# Patient Record
Sex: FEMALE | URBAN - METROPOLITAN AREA
[De-identification: names, ages, dates, MRNs, and addresses within clinical notes are randomized per-mention and may not be internally consistent; named-entity substitution may affect disease eponyms.]

---

## 2021-10-11 ENCOUNTER — HOSPITAL ENCOUNTER (OUTPATIENT)
Age: 86
Setting detail: SPECIMEN
Discharge: HOME | End: 2021-10-11
Payer: MEDICARE

## 2021-10-11 LAB
ABSOLUTE EOS #: 0.24 K/UL (ref 0–0.44)
ABSOLUTE IMMATURE GRANULOCYTE: 0 K/UL (ref 0–0.3)
ABSOLUTE LYMPH #: 4.88 K/UL (ref 1.1–3.7)
ABSOLUTE MONO #: 1.59 K/UL (ref 0.1–1.2)
BASOPHILS # BLD: 1 % (ref 0–2)
BASOPHILS ABSOLUTE: 0.12 K/UL (ref 0–0.2)
DIFFERENTIAL TYPE: ABNORMAL
EOSINOPHILS RELATIVE PERCENT: 2 % (ref 1–4)
HCT VFR BLD CALC: 41.1 % (ref 36.3–47.1)
HEMOGLOBIN: 12.7 G/DL (ref 11.9–15.1)
IMMATURE GRANULOCYTES: 0 %
LYMPHOCYTES # BLD: 40 % (ref 24–43)
MCH RBC QN AUTO: 32.5 PG (ref 25.2–33.5)
MCHC RBC AUTO-ENTMCNC: 30.9 G/DL (ref 28.4–34.8)
MCV RBC AUTO: 105.1 FL (ref 82.6–102.9)
MONOCYTES # BLD: 13 % (ref 3–12)
MORPHOLOGY: ABNORMAL
NRBC AUTOMATED: 0.9 PER 100 WBC
PDW BLD-RTO: 16.9 % (ref 11.8–14.4)
PLATELET # BLD: 220 K/UL (ref 138–453)
PLATELET ESTIMATE: ABNORMAL
PMV BLD AUTO: 12.3 FL (ref 8.1–13.5)
RBC # BLD: 3.91 M/UL (ref 3.95–5.11)
RBC # BLD: ABNORMAL 10*6/UL
SEG NEUTROPHILS: 44 % (ref 36–65)
SEGMENTED NEUTROPHILS ABSOLUTE COUNT: 5.37 K/UL (ref 1.5–8.1)
WBC # BLD: 12.2 K/UL (ref 3.5–11.3)
WBC # BLD: ABNORMAL 10*3/UL

## 2024-05-08 ENCOUNTER — APPOINTMENT (OUTPATIENT)
Dept: GENERAL RADIOLOGY | Age: 89
DRG: 603 | End: 2024-05-08
Payer: MEDICARE

## 2024-05-08 ENCOUNTER — APPOINTMENT (OUTPATIENT)
Dept: VASCULAR LAB | Age: 89
DRG: 603 | End: 2024-05-08
Payer: MEDICARE

## 2024-05-08 ENCOUNTER — HOSPITAL ENCOUNTER (INPATIENT)
Age: 89
LOS: 1 days | Discharge: HOME HEALTH | DRG: 603 | End: 2024-05-10
Attending: EMERGENCY MEDICINE | Admitting: HOSPITALIST
Payer: MEDICARE

## 2024-05-08 DIAGNOSIS — L03.116 BILATERAL CELLULITIS OF LOWER LEG: Primary | ICD-10-CM

## 2024-05-08 DIAGNOSIS — R60.0 BILATERAL LOWER EXTREMITY EDEMA: ICD-10-CM

## 2024-05-08 DIAGNOSIS — L03.115 BILATERAL CELLULITIS OF LOWER LEG: Primary | ICD-10-CM

## 2024-05-08 PROBLEM — I10 PRIMARY HYPERTENSION: Status: ACTIVE | Noted: 2024-05-08

## 2024-05-08 PROBLEM — G62.9 NEUROPATHY: Status: ACTIVE | Noted: 2024-05-08

## 2024-05-08 PROBLEM — C50.919 BREAST CANCER (HCC): Status: ACTIVE | Noted: 2024-05-08

## 2024-05-08 PROBLEM — L03.90 CELLULITIS: Status: ACTIVE | Noted: 2024-05-08

## 2024-05-08 LAB
ALBUMIN SERPL-MCNC: 4.2 G/DL (ref 3.5–5.2)
ALBUMIN/GLOB SERPL: 1.5 {RATIO} (ref 1–2.5)
ALP SERPL-CCNC: 122 U/L (ref 35–104)
ALT SERPL-CCNC: 18 U/L (ref 5–33)
ANION GAP SERPL CALCULATED.3IONS-SCNC: 12 MMOL/L (ref 9–17)
AST SERPL-CCNC: 22 U/L
BASOPHILS # BLD: 0.18 K/UL (ref 0–0.2)
BASOPHILS NFR BLD: 2 % (ref 0–2)
BILIRUB SERPL-MCNC: 0.8 MG/DL (ref 0.3–1.2)
BNP SERPL-MCNC: 2585 PG/ML
BUN SERPL-MCNC: 18 MG/DL (ref 8–23)
CALCIUM SERPL-MCNC: 8.9 MG/DL (ref 8.6–10.4)
CHLORIDE SERPL-SCNC: 105 MMOL/L (ref 98–107)
CO2 SERPL-SCNC: 23 MMOL/L (ref 20–31)
CREAT SERPL-MCNC: 0.9 MG/DL (ref 0.5–0.9)
ECHO BSA: 1.53 M2
EOSINOPHIL # BLD: 0 K/UL (ref 0–0.4)
EOSINOPHILS RELATIVE PERCENT: 0 % (ref 1–4)
ERYTHROCYTE [DISTWIDTH] IN BLOOD BY AUTOMATED COUNT: 16.2 % (ref 12.5–15.4)
GFR, ESTIMATED: 59 ML/MIN/1.73M2
GLUCOSE SERPL-MCNC: 115 MG/DL (ref 70–99)
HCT VFR BLD AUTO: 30.9 % (ref 36–46)
HGB BLD-MCNC: 10.3 G/DL (ref 12–16)
LACTATE BLDV-SCNC: 1.6 MMOL/L (ref 0.5–2.2)
LYMPHOCYTES NFR BLD: 2.02 K/UL (ref 1–4.8)
LYMPHOCYTES RELATIVE PERCENT: 22 % (ref 24–44)
MCH RBC QN AUTO: 36.7 PG (ref 26–34)
MCHC RBC AUTO-ENTMCNC: 33.4 G/DL (ref 31–37)
MCV RBC AUTO: 109.8 FL (ref 80–100)
MONOCYTES NFR BLD: 1.01 K/UL (ref 0.1–1.2)
MONOCYTES NFR BLD: 11 % (ref 2–11)
MORPHOLOGY: ABNORMAL
NEUTROPHILS NFR BLD: 65 % (ref 36–66)
NEUTS SEG NFR BLD: 5.99 K/UL (ref 1.8–7.7)
PLATELET # BLD AUTO: 234 K/UL (ref 140–450)
PMV BLD AUTO: 11 FL (ref 6–12)
POTASSIUM SERPL-SCNC: 4.4 MMOL/L (ref 3.7–5.3)
PROT SERPL-MCNC: 7 G/DL (ref 6.4–8.3)
RBC # BLD AUTO: 2.81 M/UL (ref 4–5.2)
SODIUM SERPL-SCNC: 140 MMOL/L (ref 135–144)
WBC OTHER # BLD: 9.2 K/UL (ref 3.5–11)

## 2024-05-08 PROCEDURE — G0378 HOSPITAL OBSERVATION PER HR: HCPCS

## 2024-05-08 PROCEDURE — 6360000002 HC RX W HCPCS

## 2024-05-08 PROCEDURE — 93970 EXTREMITY STUDY: CPT | Performed by: SURGERY

## 2024-05-08 PROCEDURE — 96368 THER/DIAG CONCURRENT INF: CPT

## 2024-05-08 PROCEDURE — 36415 COLL VENOUS BLD VENIPUNCTURE: CPT

## 2024-05-08 PROCEDURE — 96365 THER/PROPH/DIAG IV INF INIT: CPT

## 2024-05-08 PROCEDURE — 87040 BLOOD CULTURE FOR BACTERIA: CPT

## 2024-05-08 PROCEDURE — 83605 ASSAY OF LACTIC ACID: CPT

## 2024-05-08 PROCEDURE — 85025 COMPLETE CBC W/AUTO DIFF WBC: CPT

## 2024-05-08 PROCEDURE — 83880 ASSAY OF NATRIURETIC PEPTIDE: CPT

## 2024-05-08 PROCEDURE — 73562 X-RAY EXAM OF KNEE 3: CPT

## 2024-05-08 PROCEDURE — 96375 TX/PRO/DX INJ NEW DRUG ADDON: CPT

## 2024-05-08 PROCEDURE — 93970 EXTREMITY STUDY: CPT

## 2024-05-08 PROCEDURE — 80053 COMPREHEN METABOLIC PANEL: CPT

## 2024-05-08 PROCEDURE — 99222 1ST HOSP IP/OBS MODERATE 55: CPT

## 2024-05-08 PROCEDURE — 2580000003 HC RX 258

## 2024-05-08 PROCEDURE — 99285 EMERGENCY DEPT VISIT HI MDM: CPT

## 2024-05-08 PROCEDURE — 6370000000 HC RX 637 (ALT 250 FOR IP)

## 2024-05-08 RX ORDER — ONDANSETRON 4 MG/1
4 TABLET, ORALLY DISINTEGRATING ORAL EVERY 8 HOURS PRN
Status: DISCONTINUED | OUTPATIENT
Start: 2024-05-08 | End: 2024-05-10 | Stop reason: HOSPADM

## 2024-05-08 RX ORDER — TRAMADOL HYDROCHLORIDE 50 MG/1
50 TABLET ORAL DAILY
COMMUNITY

## 2024-05-08 RX ORDER — SODIUM CHLORIDE 0.9 % (FLUSH) 0.9 %
5-40 SYRINGE (ML) INJECTION EVERY 12 HOURS SCHEDULED
Status: DISCONTINUED | OUTPATIENT
Start: 2024-05-08 | End: 2024-05-10 | Stop reason: HOSPADM

## 2024-05-08 RX ORDER — SODIUM CHLORIDE, SODIUM LACTATE, POTASSIUM CHLORIDE, AND CALCIUM CHLORIDE .6; .31; .03; .02 G/100ML; G/100ML; G/100ML; G/100ML
1000 INJECTION, SOLUTION INTRAVENOUS ONCE
Status: COMPLETED | OUTPATIENT
Start: 2024-05-08 | End: 2024-05-08

## 2024-05-08 RX ORDER — GABAPENTIN 300 MG/1
300 CAPSULE ORAL NIGHTLY
Status: DISCONTINUED | OUTPATIENT
Start: 2024-05-08 | End: 2024-05-10 | Stop reason: HOSPADM

## 2024-05-08 RX ORDER — GABAPENTIN 300 MG/1
300 CAPSULE ORAL NIGHTLY
COMMUNITY

## 2024-05-08 RX ORDER — ENOXAPARIN SODIUM 100 MG/ML
40 INJECTION SUBCUTANEOUS DAILY
Status: DISCONTINUED | OUTPATIENT
Start: 2024-05-08 | End: 2024-05-10 | Stop reason: HOSPADM

## 2024-05-08 RX ORDER — ACETAMINOPHEN 650 MG/1
650 SUPPOSITORY RECTAL EVERY 6 HOURS PRN
Status: DISCONTINUED | OUTPATIENT
Start: 2024-05-08 | End: 2024-05-10 | Stop reason: HOSPADM

## 2024-05-08 RX ORDER — POTASSIUM CHLORIDE 7.45 MG/ML
10 INJECTION INTRAVENOUS PRN
Status: DISCONTINUED | OUTPATIENT
Start: 2024-05-08 | End: 2024-05-10 | Stop reason: HOSPADM

## 2024-05-08 RX ORDER — POTASSIUM CHLORIDE 20 MEQ/1
40 TABLET, EXTENDED RELEASE ORAL PRN
Status: DISCONTINUED | OUTPATIENT
Start: 2024-05-08 | End: 2024-05-10 | Stop reason: HOSPADM

## 2024-05-08 RX ORDER — MAGNESIUM SULFATE IN WATER 40 MG/ML
2000 INJECTION, SOLUTION INTRAVENOUS PRN
Status: DISCONTINUED | OUTPATIENT
Start: 2024-05-08 | End: 2024-05-10 | Stop reason: HOSPADM

## 2024-05-08 RX ORDER — ONDANSETRON 2 MG/ML
4 INJECTION INTRAMUSCULAR; INTRAVENOUS EVERY 6 HOURS PRN
Status: DISCONTINUED | OUTPATIENT
Start: 2024-05-08 | End: 2024-05-10 | Stop reason: HOSPADM

## 2024-05-08 RX ORDER — TRAMADOL HYDROCHLORIDE 50 MG/1
50 TABLET ORAL DAILY
Status: DISCONTINUED | OUTPATIENT
Start: 2024-05-08 | End: 2024-05-10 | Stop reason: HOSPADM

## 2024-05-08 RX ORDER — SODIUM CHLORIDE 9 MG/ML
INJECTION, SOLUTION INTRAVENOUS PRN
Status: DISCONTINUED | OUTPATIENT
Start: 2024-05-08 | End: 2024-05-10 | Stop reason: HOSPADM

## 2024-05-08 RX ORDER — FUROSEMIDE 10 MG/ML
20 INJECTION INTRAMUSCULAR; INTRAVENOUS 2 TIMES DAILY
Status: DISCONTINUED | OUTPATIENT
Start: 2024-05-08 | End: 2024-05-10

## 2024-05-08 RX ORDER — ACETAMINOPHEN 325 MG/1
650 TABLET ORAL EVERY 6 HOURS PRN
Status: DISCONTINUED | OUTPATIENT
Start: 2024-05-08 | End: 2024-05-10 | Stop reason: HOSPADM

## 2024-05-08 RX ORDER — LISINOPRIL 10 MG/1
10 TABLET ORAL DAILY
COMMUNITY

## 2024-05-08 RX ORDER — POLYETHYLENE GLYCOL 3350 17 G/17G
17 POWDER, FOR SOLUTION ORAL DAILY PRN
Status: DISCONTINUED | OUTPATIENT
Start: 2024-05-08 | End: 2024-05-10 | Stop reason: HOSPADM

## 2024-05-08 RX ORDER — SODIUM CHLORIDE 0.9 % (FLUSH) 0.9 %
5-40 SYRINGE (ML) INJECTION PRN
Status: DISCONTINUED | OUTPATIENT
Start: 2024-05-08 | End: 2024-05-10 | Stop reason: HOSPADM

## 2024-05-08 RX ORDER — LISINOPRIL 10 MG/1
10 TABLET ORAL DAILY
Status: DISCONTINUED | OUTPATIENT
Start: 2024-05-08 | End: 2024-05-10 | Stop reason: HOSPADM

## 2024-05-08 RX ADMIN — GABAPENTIN 300 MG: 300 CAPSULE ORAL at 21:51

## 2024-05-08 RX ADMIN — SODIUM CHLORIDE, POTASSIUM CHLORIDE, SODIUM LACTATE AND CALCIUM CHLORIDE 1000 ML: 600; 310; 30; 20 INJECTION, SOLUTION INTRAVENOUS at 16:53

## 2024-05-08 RX ADMIN — TRAMADOL HYDROCHLORIDE 50 MG: 50 TABLET, COATED ORAL at 21:50

## 2024-05-08 RX ADMIN — FUROSEMIDE 20 MG: 10 INJECTION, SOLUTION INTRAMUSCULAR; INTRAVENOUS at 21:51

## 2024-05-08 RX ADMIN — LISINOPRIL 10 MG: 10 TABLET ORAL at 21:50

## 2024-05-08 RX ADMIN — VANCOMYCIN HYDROCHLORIDE 750 MG: 750 INJECTION, POWDER, LYOPHILIZED, FOR SOLUTION INTRAVENOUS at 20:13

## 2024-05-08 RX ADMIN — CEFEPIME 2000 MG: 2 INJECTION, POWDER, FOR SOLUTION INTRAVENOUS at 19:40

## 2024-05-08 ASSESSMENT — PAIN - FUNCTIONAL ASSESSMENT: PAIN_FUNCTIONAL_ASSESSMENT: NONE - DENIES PAIN

## 2024-05-08 ASSESSMENT — ENCOUNTER SYMPTOMS
ALLERGIC/IMMUNOLOGIC NEGATIVE: 1
CHEST TIGHTNESS: 0
COUGH: 0
DIARRHEA: 0
EYES NEGATIVE: 1
RESPIRATORY NEGATIVE: 1
ABDOMINAL PAIN: 0
VOMITING: 0
NAUSEA: 0
CONSTIPATION: 0
GASTROINTESTINAL NEGATIVE: 1
SHORTNESS OF BREATH: 0

## 2024-05-08 NOTE — H&P
[facility]  Office: [phone]  Jj Resendez DO, Dieter Ramirez DO, Martin Bailey DO, David Guadalupe DO, Joellen Vicente MD, Jenny Wiseman MD, Neda Lazo MD, Yaritza Mcfadden MD,  Cornelius Saenz MD, Keith Do MD, Jong Jeffers MD,  Loraine Tyson DO, Alberto Jon MD, Ruben Beck MD, Garland Resendez DO, Pearl Cruz MD,  Len Aldridge DO, Olinda Siegel MD, Leandra Malagon MD, Mary Og MD, Shelby Alberts MD,  Melo Maddox MD, Annetta Morales MD, Ancelmo Mcknight MD, Gloria Shafer MD, Torsten Corcoran MD, Dung Prater MD, Vish Mujica DO, Luciano Ardon DO, Santi Case MD,  Uvaldo Daniels MD, Shirley Waterhouse, CNP,  Kristina Munoz CNP, Andre Vitale, CNP,  Antonina Lawrence, MARBELLA, Kajal Kramer, CNP, Olya Clemente, CNP, Denia Davis CNP, Lauren Connor, CNP, Muriel Cottrell, PA-C, Erica Taylor PA-C, India Kearney, CNP, Elizabet Lema, CNP, Dmitri Nichols, CNP, Kimberly High, CNP, Viviana Blanchard, CNP, Angelica Clark, CNS, Mary Jo Akbar, CNP, Laura Henderson CNP, Tracy Schwab, CNP         [facility]   IN-PATIENT SERVICE   [facility]    HISTORY AND PHYSICAL EXAMINATION            Date:   5/9/2024  Patient name:  Ana Handley  Date of admission:  5/8/2024  3:32 PM  MRN:   [id number]  Account:  [id number]  YOB: 1928  PCP:    Areli Nichols MD  Room:   [address] [place] [address]  Code Status:    Full Code    Chief Complaint:     Chief Complaint   Patient presents with    Wound Infection     Pt has multiple wounds on both legs, possible from chemo she states. Pt was sent here by doctor for IV antibiotics.      History Obtained From:     patient, electronic medical record    History of Present Illness:     Ana Handley is a 89 y.o. Unavailable / unknown female who presents with Wound Infection (Pt has multiple wounds on both legs, possible from chemo she states. Pt was sent here by doctor for IV antibiotics. )   and is admitted to the  failure playing a role.  Upon my chart review, no recent echocardiogram has been obtained.  Will initiate IV Lasix 20 twice daily and obtain an echocardiogram.  Lower extremity edema  Please see above  Lasix, echo  Breast cancer, actively undergoing chemotherapy  Outpatient follow-up with oncology  Resume home tramadol for pain  Hypertension  Resume home lisinopril  Neuropathy  Resume home gabapentin    Consultations:   IP CONSULT TO ORTHOPEDIC SURGERY    LAINEY Kovacs  5/9/2024  1:11 AM    Copy sent to Areli Crespo MD

## 2024-05-08 NOTE — PROGRESS NOTES
[facility]   Pharmacy Pharmacokinetic Monitoring Service - Vancomycin     Ana Handley is a 89 y.o. female starting on vancomycin therapy for SSTI. Pharmacy consulted by Ladarius Fraser for monitoring and adjustment.    Target Concentration: Goal AUC/MERLE 400-600 mg*hr/L    Additional Antimicrobials: cefepime    Pertinent Laboratory Values:   Wt Readings from Last 1 Encounters:   05/08/24 52.2 kg (115 lb)     Temp Readings from Last 1 Encounters:   05/08/24 97.7 °F (36.5 °C) (Oral)     Estimated Creatinine Clearance: 30 mL/min (based on SCr of 0.9 mg/dL).  Recent Labs     05/06/24  1133 05/08/24  1640   CREATININE 0.80 0.9   BUN 15 18   WBC 8.36 9.2     Procalcitonin:     Pertinent Cultures:  Culture Date Source Results        MRSA Nasal Swab: N/A. Non-respiratory infection.    Plan:  Dosing recommendations based on Bayesian software  Start vancomycin 750 mg every 24 hours  Anticipated AUC of 455 and trough concentration of 12.9 at steady state  Renal labs as indicated   Vancomycin concentration ordered for TBD @ TBD   Pharmacy will continue to monitor patient and adjust therapy as indicated    Thank you for the consult,  CECELIA NINO [facility]  5/8/2024 5:52 PM

## 2024-05-08 NOTE — ED PROVIDER NOTES
[address]  [address] [facility] [address]  Phone: [phone]    Pt Name: Ana Handley  MRN: [id number]  Birthdate 4/30/1928  Date of evaluation: 5/8/24    Ana Handley is a 89 y.o. female with CC: Wound Infection (Pt has multiple wounds on both legs, possible from chemo she states. Pt was sent here by doctor for IV antibiotics. )      MDM:   89-year-old female with a history of cancer, currently undergoing chemotherapy who presents emergency department at the direction of her physician due to concern for cellulitis.  Both of her lower extremities are edematous and hot to touch, there is a left knee medial effusion as well as a right knee large hematoma with adjacent blister and likely surrounding cellulitis.  There are other areas on the anterior shins with degloving but no active weeping or drainage.  She is afebrile here.  No elevation in WBCs.  No elevation in lactic acid.  Will abstain from draining the blister to prevent potential for further introduction of other pathogens.  Blood cultures obtained prior to initiation of antibiotics.  BNP was found to be significantly elevated, no known history of heart failure.  Venous Doppler bilateral lower extremities with no obvious evidence of DVT.  Will admit to hospitalist.  Family inquiring about wound care as well as ID consult.    Vitals:    05/08/24 1657 05/08/24 1700 05/08/24 2045 05/08/24 2220   BP: 125/69 131/86 136/62    Pulse:  76 87    Resp:  16 18 16   Temp:   97.5 °F (36.4 °C)    TempSrc:   Oral    SpO2:  98% 97%    Weight:       Height:           ED Course as of 05/08/24 2317   Wed May 08, 2024   1730 X-ray of the knee he does not show any concerns for osteomyelitis or suspected intra-articular infection at this time.  Suspect the hematoma is secondary to infection and inflammatory response.  At this time would not opt to drain it. [AH]   1852 Metabolic workup demonstrates stable anemia, no profound leukocytosis, no electrolyte  file.  SURGICAL HISTORY     No past surgical history on file.  CURRENT MEDICATIONS       Current Discharge Medication List        CONTINUE these medications which have NOT CHANGED    Details   gabapentin (NEURONTIN) 300 MG capsule Take 1 capsule by mouth at bedtime.      lisinopril (PRINIVIL;ZESTRIL) 10 MG tablet Take 1 tablet by mouth daily      traMADol (ULTRAM) 50 MG tablet Take 1 tablet by mouth daily. Max Daily Amount: 50 mg           ALLERGIES     is allergic to aspirin and oxycodone.  FAMILY HISTORY     has no family status information on file.      SOCIAL HISTORY          I performed a history and physical examination of the patient and discussed management with the mid level provideer. I reviewed the mid level provider's note and agree with the documented findings and plan of care. Any areas of disagreement are noted on the chart. I was personally present for the key portions of any procedures. I have documented in the chart those procedures where I was not present during the key portions. I have reviewed the emergency nurses triage note. I agree with the chief complaint, past medical history, past surgical history, allergies, medications, social and family history as documented unless otherwise noted below. Documentation of the HPI, Physical Exam and Medical Decision Making performed by mid level providers is based on my personal performance of the HPI, PE and MDM. For Physician Assistant/ Nurse Practitioner cases/documentation I have personally evaluated this patient and have completed at least one if not all key elements of the E/M (history, physical exam, and MDM). Additional findings are as noted.    Margaret Lorenzo DO  Attending Emergency Physician        Margaret Lorenzo DO  05/08/24 3384

## 2024-05-08 NOTE — ED PROVIDER NOTES
Toxic Shock Syndrome, DRESS syndrome, Chicken Pox, Varicella Zoster, Herpes, Parvovirus B19, eningococcemia, Pemphigus Vulgaris     ED COURSE:    ED Course as of 05/09/24 2132   Wed May 08, 2024   1730 X-ray of the knee he does not show any concerns for osteomyelitis or suspected intra-articular infection at this time.  Suspect the hematoma is secondary to infection and inflammatory response.  At this time would not opt to drain it. []   1852 Metabolic workup demonstrates stable anemia, no profound leukocytosis, no electrolyte disturbance, renal dysfunction, liver dysfunction.  Lactic acid of 1.6 broad spectrum antibiotics initiated. Will reach out to the on call hospitalist for admission.  []   1917 I spoke with Muriel RETANA who is agreeable for admission.  []      ED Course User Index  [] Ladarius Fraser, APRN - CNP       Medical Decision Making:    This patient was seen and evaluated in the Emergency Department for complaints of progressively worsening wounds of the bilateral lower extremities . ED course notes above detail the events of their care. After detailed history, physical examination, and work-up with pertinent findings of metabolic workup without evidence of profound leukocytosis, lactic acidosis, anemia, renal or liver dysfunction were found. XR of the knee did not show evidence of osteomyelitis, venous doppler without evidence of DVT. I suspect their complaints are secondary to chronic poorly healing wounds complicated by history of breast cancer currently on chemotherapy thus placing the patient in an immunocompromised state . I have low suspicion for sepsi. She was initiated on broad spectrum antibiotics.and evaluated by the Benson RETANA who was agreeable for admission.    The patient was admitted to [facility] under [facility] . The decision to admit was made via shared decision making between myself, the attending physician, and the patient who are all agreeable for admission for  further  changes of the medial compartment. 3. No radiographic evidence of osteomyelitis.       Vascular duplex lower extremity venous bilateral   Final Result      XR KNEE RIGHT (3 VIEWS)   Final Result   1. Soft tissue swelling medial to the knee with suggestion of a joint   effusion.   2. Degenerative changes of the medial compartment.   3. No radiographic evidence of osteomyelitis.             Labs:  Results for orders placed or performed during the hospital encounter of 05/08/24   Blood Culture 1    Specimen: Blood   Result Value Ref Range    Specimen Description .BLOOD     Special Requests LT ARM, 20ML     Culture NO GROWTH 12 HOURS    Culture, Blood 2    Specimen: Blood   Result Value Ref Range    Specimen Description .BLOOD     Special Requests LEFT WRIST 15CC     Culture NO GROWTH 12 HOURS    CBC with Auto Differential   Result Value Ref Range    WBC 9.2 3.5 - 11.0 k/uL    RBC 2.81 (L) 4.0 - 5.2 m/uL    Hemoglobin 10.3 (L) 12.0 - 16.0 g/dL    Hematocrit 30.9 (L) 36 - 46 %    .8 (H) 80 - 100 fL    MCH 36.7 (H) 26 - 34 pg    MCHC 33.4 31 - 37 g/dL    RDW 16.2 (H) 12.5 - 15.4 %    Platelets 234 140 - 450 k/uL    MPV 11.0 6.0 - 12.0 fL    Neutrophils % 65 36 - 66 %    Lymphocytes % 22 (L) 24 - 44 %    Monocytes % 11 2 - 11 %    Eosinophils % 0 (L) 1 - 4 %    Basophils % 2 0 - 2 %    Neutrophils Absolute 5.99 1.8 - 7.7 k/uL    Lymphocytes Absolute 2.02 1.0 - 4.8 k/uL    Monocytes Absolute 1.01 0.1 - 1.2 k/uL    Eosinophils Absolute 0.00 0.0 - 0.4 k/uL    Basophils Absolute 0.18 0.0 - 0.2 k/uL    Morphology MACROCYTOSIS PRESENT     Morphology LITTLEJOHN-JOLLY BODIES PRESENT     Morphology Giant Platelets present     Morphology 1+ ACANTHOCYTES    CMP   Result Value Ref Range    Sodium 140 135 - 144 mmol/L    Potassium 4.4 3.7 - 5.3 mmol/L    Chloride 105 98 - 107 mmol/L    CO2 23 20 - 31 mmol/L    Anion Gap 12 9 - 17 mmol/L    Glucose 115 (H) 70 - 99 mg/dL    BUN 18 8 - 23 mg/dL    Creatinine 0.9 0.5 - 0.9 mg/dL    Est,

## 2024-05-09 PROBLEM — L03.115 BILATERAL CELLULITIS OF LOWER LEG: Status: ACTIVE | Noted: 2024-05-09

## 2024-05-09 PROBLEM — L03.116 BILATERAL CELLULITIS OF LOWER LEG: Status: ACTIVE | Noted: 2024-05-09

## 2024-05-09 LAB — PROCALCITONIN SERPL-MCNC: 0.1 NG/ML (ref 0–0.09)

## 2024-05-09 PROCEDURE — 96367 TX/PROPH/DG ADDL SEQ IV INF: CPT

## 2024-05-09 PROCEDURE — 36415 COLL VENOUS BLD VENIPUNCTURE: CPT

## 2024-05-09 PROCEDURE — 6360000002 HC RX W HCPCS

## 2024-05-09 PROCEDURE — 99223 1ST HOSP IP/OBS HIGH 75: CPT | Performed by: STUDENT IN AN ORGANIZED HEALTH CARE EDUCATION/TRAINING PROGRAM

## 2024-05-09 PROCEDURE — G0378 HOSPITAL OBSERVATION PER HR: HCPCS

## 2024-05-09 PROCEDURE — 6370000000 HC RX 637 (ALT 250 FOR IP)

## 2024-05-09 PROCEDURE — 99232 SBSQ HOSP IP/OBS MODERATE 35: CPT | Performed by: HOSPITALIST

## 2024-05-09 PROCEDURE — 97162 PT EVAL MOD COMPLEX 30 MIN: CPT

## 2024-05-09 PROCEDURE — 96372 THER/PROPH/DIAG INJ SC/IM: CPT

## 2024-05-09 PROCEDURE — 96366 THER/PROPH/DIAG IV INF ADDON: CPT

## 2024-05-09 PROCEDURE — 97535 SELF CARE MNGMENT TRAINING: CPT

## 2024-05-09 PROCEDURE — 2580000003 HC RX 258

## 2024-05-09 PROCEDURE — 84145 PROCALCITONIN (PCT): CPT

## 2024-05-09 PROCEDURE — 97116 GAIT TRAINING THERAPY: CPT

## 2024-05-09 PROCEDURE — 96376 TX/PRO/DX INJ SAME DRUG ADON: CPT

## 2024-05-09 PROCEDURE — 97166 OT EVAL MOD COMPLEX 45 MIN: CPT

## 2024-05-09 RX ADMIN — FUROSEMIDE 20 MG: 10 INJECTION, SOLUTION INTRAMUSCULAR; INTRAVENOUS at 18:51

## 2024-05-09 RX ADMIN — SODIUM CHLORIDE, PRESERVATIVE FREE 10 ML: 5 INJECTION INTRAVENOUS at 20:31

## 2024-05-09 RX ADMIN — CEFAZOLIN 1000 MG: 1 INJECTION, POWDER, FOR SOLUTION INTRAMUSCULAR; INTRAVENOUS at 04:30

## 2024-05-09 RX ADMIN — FUROSEMIDE 20 MG: 10 INJECTION, SOLUTION INTRAMUSCULAR; INTRAVENOUS at 08:10

## 2024-05-09 RX ADMIN — ENOXAPARIN SODIUM 40 MG: 100 INJECTION SUBCUTANEOUS at 08:11

## 2024-05-09 RX ADMIN — LISINOPRIL 10 MG: 10 TABLET ORAL at 08:10

## 2024-05-09 RX ADMIN — SODIUM CHLORIDE, PRESERVATIVE FREE 10 ML: 5 INJECTION INTRAVENOUS at 18:52

## 2024-05-09 RX ADMIN — CEFAZOLIN 1000 MG: 1 INJECTION, POWDER, FOR SOLUTION INTRAMUSCULAR; INTRAVENOUS at 20:36

## 2024-05-09 RX ADMIN — SODIUM CHLORIDE, PRESERVATIVE FREE 10 ML: 5 INJECTION INTRAVENOUS at 08:10

## 2024-05-09 RX ADMIN — GABAPENTIN 300 MG: 300 CAPSULE ORAL at 20:37

## 2024-05-09 RX ADMIN — CEFAZOLIN 1000 MG: 1 INJECTION, POWDER, FOR SOLUTION INTRAMUSCULAR; INTRAVENOUS at 13:02

## 2024-05-09 RX ADMIN — TRAMADOL HYDROCHLORIDE 50 MG: 50 TABLET, COATED ORAL at 08:10

## 2024-05-09 NOTE — PLAN OF CARE
Problem: Discharge Planning  Goal: Discharge to home or other facility with appropriate resources  Outcome: Progressing  Flowsheets (Taken 5/8/2024 2030)  Discharge to home or other facility with appropriate resources:   Identify barriers to discharge with patient and caregiver   Arrange for needed discharge resources and transportation as appropriate   Identify discharge learning needs (meds, wound care, etc)     Problem: Safety - Adult  Goal: Free from fall injury  Outcome: Progressing     Problem: ABCDS Injury Assessment  Goal: Absence of physical injury  Outcome: Progressing     Problem: Skin/Tissue Integrity  Goal: Absence of new skin breakdown  Description: 1.  Monitor for areas of redness and/or skin breakdown  2.  Assess vascular access sites hourly  3.  Every 4-6 hours minimum:  Change oxygen saturation probe site  4.  Every 4-6 hours:  If on nasal continuous positive airway pressure, respiratory therapy assess nares and determine need for appliance change or resting period.  Outcome: Progressing

## 2024-05-09 NOTE — PROGRESS NOTES
Occupational Therapy  Facility/Department: [address]  Occupational Therapy Initial Assessment    Name: Ana Handley  : 1928  MRN: [id number]  Date of Service: 2024  Chief Complaint   Patient presents with    Wound Infection     Pt has multiple wounds on both legs, possible from chemo she states. Pt was sent here by doctor for IV antibiotics.          Discharge Recommendations:  Patient would benefit from continued therapy after discharge  OT Equipment Recommendations  Other: AE/DME TBD       Patient Diagnosis(es): The primary encounter diagnosis was Bilateral cellulitis of lower leg. A diagnosis of Bilateral lower extremity edema was also pertinent to this visit.  Past Medical History:  has no past medical history on file.  Past Surgical History:  has no past surgical history on file.           Assessment   Performance deficits / Impairments: Decreased functional mobility ;Decreased ADL status;Decreased safe awareness  Assessment: Admit due to LE cellulitis. Pt presents with the above deficits with balance and tolerance being main hinderence to safe completion of tasks. PLOF pt indep with ADLs and Mobility with 4WW. Pt goes down to Dinning room for meals and does complete some simple meal prep on her own. Does also complete her own laundry but needs to walkl to the laundry room. Pt would benefit from skilled OT here and post discharge to support return to Indep ADLs. Pt should be safe to return to her Prior living situation  Decision Making: Medium Complexity  REQUIRES OT FOLLOW-UP: Yes  Activity Tolerance  Activity Tolerance: Patient Tolerated treatment well;Patient limited by pain  Activity Tolerance Comments: pt was able to stand at sink to 5 minutes to groom with unilateral support,SBA, min cues for walker placement        Plan   Occupational Therapy Plan  Times Per Week: 5-6x/wk  Times Per Day: Once a day  Current Treatment Recommendations: Balance training, Functional mobility training,  Neuromuscular re-education, Safety education & training, Patient/Caregiver education & training, Equipment evaluation, education, & procurement, Self-Care / ADL     Restrictions  Restrictions/Precautions  Restrictions/Precautions: General Precautions  Required Braces or Orthoses?: No    Subjective   General  Patient assessed for rehabilitation services?: Yes  Family / Caregiver Present: No  Subjective  Subjective: Has lower back pain, 5/10  General Comment  Comments: OK to see     Social/Functional History  Social/Functional History  Lives With: Alone  Type of Home: Senior housing apartment ([facility])  Home Layout: One level  Home Access: Elevator  Bathroom Shower/Tub: Walk-in shower  Bathroom Toilet: Handicap height  Bathroom Equipment: Grab bars in shower, Shower chair, Grab bars around toilet  Home Equipment: Rollator, Cane  Has the patient had two or more falls in the past year or any fall with injury in the past year?: No  Receives Help From: Family, Friend(s) (Pt reports good family support)  ADL Assistance: Independent  Homemaking Assistance: Needs assistance (Dining room for meals although does light meal prep; family grocery shops.)  Ambulation Assistance: Independent (rollator)  Transfer Assistance: Independent  Active : No  Patient's  Info: family transports  Occupation: Retired       Objective           Safety Devices  Type of Devices: All fall risk precautions in place;Chair alarm in place;Gait belt;Call light within reach;Patient at risk for falls;Left in chair;Nurse notified  Restraints  Restraints Initially in Place: No  Balance  Sitting:  (good static and dynamic)  Standing:  (static and dynamic F 4WW required)  Toilet Transfers  Toilet - Technique: Ambulating  Equipment Used: Standard toilet (grab bar)  Toilet Transfer: Stand by assistance  Toilet Transfers Comments: sporadic cues for ssafe transfer technique, needed to back up closure to commoe prior to sitting  AROM:

## 2024-05-09 NOTE — PROGRESS NOTES
[facility]  Office: [phone]  Jj Resendez DO, Dieter Ramirez DO, aMrtin Bailey DO, David Guadalupe DO, Joellen Vicente MD, Jenny Wiseman MD, Neda Lazo MD, Yaritza Mcfadden MD,  Cornelius Saenz MD, Keith Do MD, Jong eJffers MD,  Loraine Tyson DO, Alberto Jon MD, Ruben Beck MD, Garland Resendez DO, Pearl Cruz MD,  Len Aldridge DO, Olinda Siegel MD, Leandra Malagon MD, Mary Og MD, Shelby Alberts MD,  Melo Maddox MD, Annetta Morales MD, Ancelmo Mcknight MD, Gloria Shafer MD, Torsten Corcoran MD, Dung Prater MD, Vish Mujica DO, Luciano Ardon DO, Santi Case MD,  Uvaldo Daniels MD, Shirley Waterhouse, CNP,  Kristina Munoz CNP, Andre Vitale, CNP,  Antonina Lawrence, MARBELLA, Kajal Kramer, CNP, Olya Clemente, CNP, Denia Davis CNP, Lauren Connor, CNP, Muriel Cottrell, PA-C, Erica Taylor PA-C, India Kearney, CNP, Elizabet Lema, CNP, Dmitri Nichols, CNP, Kimberly High, CNP, Viviana Blanchard, CNP, Angelica Clark, CNS, Mary Jo Akbar, CNP, Laura Henderson CNP, Tracy Schwab, CNP         [facility]   IN-PATIENT SERVICE   [facility]    Progress Note    5/9/2024    10:56 AM    Name:   Ana Handley  MRN:     [id number]     Acct:      [id number]   Room:   313/313-01   Day:  0  Admit Date:  5/8/2024  3:32 PM    PCP:   Areli Nichols MD  Code Status:  Full Code    Subjective:     Patient seen in follow-up for right lower extremity cellulitis, patient states \"I feel okay\"    Patient is doing reasonably well.  She states that she is feeling better overall.  She has been started on Ancef for her right lower extremity cellulitis.  The patient does have some chronic wounds and I will ask wound ostomy to evaluate the patient.  I do not appreciate any areas consistent with abscess.  The patient did ask whether or not she would require surgical intervention at this point in time I do not see any area that requires a surgeon.  I did explain to the    CALCIUM 8.5* 8.9   PROBNP  --  2,585*     Recent Labs     05/06/24  1133 05/08/24  1640   AST 23 22   ALT 15 18   ALKPHOS 86 122*   BILITOT 0.6 0.8     ABG:No results found for: \"POCPH\", \"PHART\", \"PH\", \"POCPCO2\", \"AOH5MZU\", \"PCO2\", \"POCPO2\", \"PO2ART\", \"PO2\", \"POCHCO3\", \"WMH6AQR\", \"HCO3\", \"NBEA\", \"PBEA\", \"BEART\", \"BE\", \"THGBART\", \"THB\", \"OXT7FJA\", \"NLAI4VCW\", \"V4YVJKVS\", \"O2SAT\", \"FIO2\"  Lab Results   Component Value Date/Time    SPECIAL LEFT WRIST 15CC 05/08/2024 04:56 PM     Lab Results   Component Value Date/Time    CULTURE NO GROWTH <24 HRS 05/08/2024 04:56 PM    CULTURE FINAL 03/29/2024 12:16 PM       Radiology:  XR KNEE RIGHT (3 VIEWS)    Result Date: 5/8/2024  1. Soft tissue swelling medial to the knee with suggestion of a joint effusion. 2. Degenerative changes of the medial compartment. 3. No radiographic evidence of osteomyelitis.       Physical Examination:     General appearance:  alert, cooperative and no distress  Mental Status:  oriented to person, place and time and normal affect  Lungs:  clear to auscultation bilaterally, normal effort  Heart:  regular rate and rhythm, no murmur  Abdomen:  soft, nontender, nondistended, normal bowel sounds, no masses, hepatomegaly, splenomegaly  Extremities: Chronic bilateral lower extremity edema, erythematous changes involving both legs noted with right greater than left  Skin: Open wound on the right lower extremity noted    Assessment:     Hospital Problems             Last Modified POA    * (Principal) Cellulitis 5/8/2024 Yes    Breast cancer (HCC) 5/8/2024 Yes    Neuropathy 5/8/2024 Yes    Primary hypertension 5/8/2024 Yes    Lower extremity edema 5/8/2024 Yes       Plan:     Right lower extremity cellulitis  Continue Ancef  Follow cultures  Consult wound care  Right lower extremity wound  Consult wound care  Bilateral lower extremity edema  Continue Lasix  Await echocardiogram  Essential hypertension  Continue lisinopril  Vitals trend reviewed, blood

## 2024-05-09 NOTE — CARE COORDINATION
Case Management Assessment  Initial Evaluation    Date/Time of Evaluation: 5/9/2024 3:46 PM  Assessment Completed by: Jamila Pal RN    If patient is discharged prior to next notation, then this note serves as note for discharge by case management.    Patient Name: Ana Handley                   YOB: 1928  Diagnosis: Cellulitis [L03.90]  Bilateral lower extremity edema [R60.0]  Bilateral cellulitis of lower leg [L03.116, L03.115]                   Date / Time: 5/8/2024  3:32 PM    Patient Admission Status: Observation   Readmission Risk (Low < 19, Mod (19-27), High > 27): No data recorded  Current PCP: Areli Nichols MD  PCP verified by ? Yes    Chart Reviewed: Yes      History Provided by: Patient  Patient Orientation: Alert and Oriented    Patient Cognition: Alert    Hospitalization in the last 30 days (Readmission):  No    If yes, Readmission Assessment in  Navigator will be completed.    Advance Directives:      Code Status: Full Code   Patient's Primary Decision Maker is: Legal Next of Kin    Primary Decision Maker: Alexandra Boone - Child - [phone]    Discharge Planning:    Patient lives with: Alone Type of Home: Independent Living  Primary Care Giver: Self  Patient Support Systems include: Children, Family Members   Current Financial resources: Medicare  Current community resources: None  Current services prior to admission: Durable Medical Equipment            Current DME: Cane, Shower Chair, Other (Comment) (Rollator, Grab Bars, Toliet Riser)            Type of Home Care services:  None    ADLS  Prior functional level: Cooking, Housework, Shopping, Assistance with the following:  Current functional level: Assistance with the following:, Cooking, Housework, Shopping    PT AM-PAC: 18 /24  OT AM-PAC: 19 /24    Family can provide assistance at DC: Yes (Daughter is retired nurse)  Would you like Case Management to discuss the discharge plan with any other family members/significant

## 2024-05-09 NOTE — PLAN OF CARE
Problem: Discharge Planning  Goal: Discharge to home or other facility with appropriate resources  5/9/2024 1628 by Monserrat Mcdaniel RN  Outcome: Progressing     Problem: Safety - Adult  Goal: Free from fall injury  5/9/2024 1628 by Monserrat Mcdaniel RN  Outcome: Progressing     Problem: ABCDS Injury Assessment  Goal: Absence of physical injury  5/9/2024 1628 by Monserrat Mcdaniel RN  Outcome: Progressing     Problem: Skin/Tissue Integrity  Goal: Absence of new skin breakdown  Description: 1.  Monitor for areas of redness and/or skin breakdown  2.  Assess vascular access sites hourly  3.  Every 4-6 hours minimum:  Change oxygen saturation probe site  4.  Every 4-6 hours:  If on nasal continuous positive airway pressure, respiratory therapy assess nares and determine need for appliance change or resting period.  5/9/2024 1628 by Monserrat Mcdaniel, RN  Outcome: Progressing

## 2024-05-09 NOTE — PROGRESS NOTES
Physical Therapy  Facility/Department: [address]  Physical Therapy Initial Assessment    Name: Ana Handley  : 1928  MRN: [id number]  Date of Service: 2024  Chief Complaint   Patient presents with    Wound Infection     Pt has multiple wounds on both legs, possible from chemo she states. Pt was sent here by doctor for IV antibiotics.         Discharge Recommendations:  Patient would benefit from continued therapy after discharge   PT Equipment Recommendations  Equipment Needed: No  Other: Pt has rollator.      Patient Diagnosis(es): The primary encounter diagnosis was Bilateral cellulitis of lower leg. A diagnosis of Bilateral lower extremity edema was also pertinent to this visit.  Past Medical History:  has no past medical history on file.  Past Surgical History:  has no past surgical history on file.    Assessment   Body Structures, Functions, Activity Limitations Requiring Skilled Therapeutic Intervention: Decreased functional mobility ;Decreased strength;Decreased safe awareness;Decreased endurance;Decreased balance  Assessment: Pt presents with generalized weakness and impaired mobility with admission for cellulitis; pt currently undergoing chemo for breast cancer. [place] and was Independent with mobility with rollator and Independent with ADL's. Currently, pt requires SBA for transfers and ambulated 10' and 20' SBA with rollator. Pt would be unsafe to return to previous living arrangement at this time but may be safe with assistance pending additional progress. Pt would benefit from further therapy upon discharge.  Treatment Diagnosis: generalized weakness  Therapy Prognosis: Fair  Decision Making: Medium Complexity  Requires PT Follow-Up: Yes  Activity Tolerance  Activity Tolerance: Patient tolerated evaluation without incident;Patient limited by endurance     Plan   Physical Therapy Plan  General Plan:  (5-6x/week)  Current Treatment Recommendations: Strengthening, Balance  None  How much help is needed moving to and from a bed to a chair?: A Little  How much help is needed standing up from a chair using your arms?: A Little  How much help is needed walking in hospital room?: A Little  How much help is needed climbing 3-5 steps with a railing?: Total  AM-PAC Inpatient Mobility Raw Score : 18  AM-PAC Inpatient T-Scale Score : 43.63  Mobility Inpatient CMS 0-100% Score: 46.58  Mobility Inpatient CMS G-Code Modifier : CK         Tinneti Score       Goals  Short Term Goals  Time Frame for Short Term Goals: 14 visits  Short Term Goal 1: Independent transfers with device as needed.  Short Term Goal 2: Pt to perform LE PRE's seated and standing x 20 reps to improve strength for mobility.  Short Term Goal 3: Pt to ambulate 150' Modified (I) with rollator for functional ambulation within [place] environment.  Patient Goals   Patient Goals : Return home       Education  Patient Education  Education Given To: Patient  Education Provided: Role of Therapy;Plan of Care;Transfer Training  Education Provided Comments: importance of mobility  Education Method: Verbal  Barriers to Learning: None  Education Outcome: Verbalized understanding      Therapy Time   Individual Concurrent Group Co-treatment   Time In 1021         Time Out 1053         Minutes 32         Timed Code Treatment Minutes: 10 Minutes       Queta Hutton, PT

## 2024-05-09 NOTE — CONSULTS
Orthopedic Consult      CHIEF COMPLAINT: Right leg blistering    HISTORY OF PRESENT ILLNESS:      The patient is a 89 y.o. female who presented to [facility] for wounds and swelling to bilateral legs.  Patient has past medical history of breast cancer, is currently undergoing chemotherapy, but after conversing with patient's daughter-in-law on the phone, this is planned to be stopped soon.  She was admitted for cellulitic changes, with blisters to bilateral legs.  She was referred to the ED by her oncologist for IV antibiotics.  Patient denies any injuries to the bilateral lower extremities.  Patient states the last time she would have injured her knee would have been over a year ago.  Patient states the blisters appeared approximately 1-2 weeks ago.  Patient denies any numbness or tingling.  Patient ambulates with a rolling walker.  Patient has been ambulatory since admission, denies any specific pain or issue.    Past Medical History:    History reviewed. No pertinent past medical history.    Past Surgical History:    History reviewed. No pertinent surgical history.    Medications Prior to Admission:   Prior to Admission medications    Medication Sig Start Date End Date Taking? Authorizing Provider   gabapentin (NEURONTIN) 300 MG capsule Take 1 capsule by mouth at bedtime.   Yes Federico Blackman MD   lisinopril (PRINIVIL;ZESTRIL) 10 MG tablet Take 1 tablet by mouth daily   Yes Federico Blackman MD   traMADol (ULTRAM) 50 MG tablet Take 1 tablet by mouth daily. Max Daily Amount: 50 mg   Yes Federico Blackman MD       Allergies:    Aspirin and Oxycodone      Social History:   Social History     Socioeconomic History    Marital status:      Spouse name: None    Number of children: None    Years of education: None    Highest education level: None     Social Determinants of Health     Food Insecurity: No Food Insecurity (5/8/2024)    Hunger Vital Sign     Worried About Running Out of Food in  the Last Year: Never true     Ran Out of Food in the Last Year: Never true   Transportation Needs: No Transportation Needs (5/8/2024)    PRAPARE - Transportation     Lack of Transportation (Medical): No     Lack of Transportation (Non-Medical): No   Housing Stability: Unknown (5/8/2024)    Housing Stability Vital Sign     Unable to Pay for Housing in the Last Year: No     Unstable Housing in the Last Year: No         Family History:  No family history on file.      REVIEW OF SYSTEMS:   Denies CP, SOB, Nausea, Vomiting, Fevers, Chills, Numbness, Tingling.   10 remaining systems reviewed and negative    PHYSICAL EXAM:  Blood pressure (!) 105/47, pulse 80, temperature 98.2 °F (36.8 °C), temperature source Oral, resp. rate 16, height 1.626 m (5' 4\"), weight 52.2 kg (115 lb), SpO2 93 %.Body mass index is 19.74 kg/m².  Gen: alert and oriented, NAD, cooperative  Head: normocephalic atraumatic   Cardiovascular: Regular rate, no dependent edema, distal pulses 2+  Respiratory: Chest symmetric, no accessory muscle use, normal respirations  RLE:  - log roll. no TTP, deformity, ecchymosis, abrasion or laceration.  Blistering noted to medial aspect of right leg, just proximal to the patella.  No active drainage.  Very fluctuant in nature.  Compartments soft.  Leg is warm and well-perfused. TA/EHL/FHL/GS motor intact. Saph/Bogdan/DP/SP nerves SILT.  Patient able to flex and extend right knee with no pain or discomfort.  Patient is able to straight leg raise.  LLE:  - log roll. no TTP, deformity, ecchymosis, abrasion or laceration.  Small blister to posterior aspect of right knee within the popliteal fossa.  Minimal drainage noted.  No erythema or signs of infection.  Compartments soft.  Leg is warm and well perfused. TA/EHL/FHL/GS motor intact. Saph/Bogdan/DP/SP nerves SILT.      LABS:  Recent Labs     05/08/24  1640   WBC 9.2   HGB 10.3*   HCT 30.9*         K 4.4   BUN 18   CREATININE 0.9   GLUCOSE 115*        Radiology:

## 2024-05-10 VITALS
RESPIRATION RATE: 16 BRPM | HEIGHT: 64 IN | TEMPERATURE: 97.7 F | HEART RATE: 120 BPM | OXYGEN SATURATION: 93 % | DIASTOLIC BLOOD PRESSURE: 63 MMHG | WEIGHT: 115 LBS | BODY MASS INDEX: 19.63 KG/M2 | SYSTOLIC BLOOD PRESSURE: 115 MMHG

## 2024-05-10 PROCEDURE — G0378 HOSPITAL OBSERVATION PER HR: HCPCS

## 2024-05-10 PROCEDURE — 99232 SBSQ HOSP IP/OBS MODERATE 35: CPT | Performed by: HOSPITALIST

## 2024-05-10 PROCEDURE — 97110 THERAPEUTIC EXERCISES: CPT

## 2024-05-10 PROCEDURE — 6370000000 HC RX 637 (ALT 250 FOR IP)

## 2024-05-10 PROCEDURE — 97116 GAIT TRAINING THERAPY: CPT

## 2024-05-10 PROCEDURE — 96366 THER/PROPH/DIAG IV INF ADDON: CPT

## 2024-05-10 PROCEDURE — 96376 TX/PRO/DX INJ SAME DRUG ADON: CPT

## 2024-05-10 PROCEDURE — 2580000003 HC RX 258

## 2024-05-10 PROCEDURE — 6360000002 HC RX W HCPCS

## 2024-05-10 PROCEDURE — 96372 THER/PROPH/DIAG INJ SC/IM: CPT

## 2024-05-10 RX ORDER — FUROSEMIDE 20 MG/1
20 TABLET ORAL DAILY
Qty: 60 TABLET | Refills: 3 | Status: SHIPPED | OUTPATIENT
Start: 2024-05-10 | End: 2024-05-10

## 2024-05-10 RX ORDER — CEPHALEXIN 500 MG/1
500 CAPSULE ORAL 3 TIMES DAILY
Qty: 21 CAPSULE | Refills: 0 | Status: SHIPPED | OUTPATIENT
Start: 2024-05-10 | End: 2024-05-10

## 2024-05-10 RX ORDER — CEPHALEXIN 500 MG/1
500 CAPSULE ORAL 3 TIMES DAILY
Qty: 21 CAPSULE | Refills: 0 | Status: SHIPPED | OUTPATIENT
Start: 2024-05-10 | End: 2024-05-17

## 2024-05-10 RX ORDER — FUROSEMIDE 20 MG/1
20 TABLET ORAL DAILY
Qty: 60 TABLET | Refills: 3 | Status: SHIPPED | OUTPATIENT
Start: 2024-05-10

## 2024-05-10 RX ADMIN — CEFAZOLIN 1000 MG: 1 INJECTION, POWDER, FOR SOLUTION INTRAMUSCULAR; INTRAVENOUS at 04:41

## 2024-05-10 RX ADMIN — LISINOPRIL 10 MG: 10 TABLET ORAL at 08:34

## 2024-05-10 RX ADMIN — ENOXAPARIN SODIUM 40 MG: 100 INJECTION SUBCUTANEOUS at 08:34

## 2024-05-10 RX ADMIN — SODIUM CHLORIDE, PRESERVATIVE FREE 10 ML: 5 INJECTION INTRAVENOUS at 08:34

## 2024-05-10 RX ADMIN — TRAMADOL HYDROCHLORIDE 50 MG: 50 TABLET, COATED ORAL at 08:34

## 2024-05-10 RX ADMIN — ACETAMINOPHEN 650 MG: 325 TABLET ORAL at 08:33

## 2024-05-10 RX ADMIN — FUROSEMIDE 20 MG: 10 INJECTION, SOLUTION INTRAMUSCULAR; INTRAVENOUS at 08:34

## 2024-05-10 ASSESSMENT — PAIN SCALES - GENERAL
PAINLEVEL_OUTOF10: 3
PAINLEVEL_OUTOF10: 1

## 2024-05-10 NOTE — PLAN OF CARE
Problem: Discharge Planning  Goal: Discharge to home or other facility with appropriate resources  5/10/2024 0352 by Hiro Wynne, RN  Outcome: Progressing  Flowsheets (Taken 5/9/2024 2000)  Discharge to home or other facility with appropriate resources:   Identify barriers to discharge with patient and caregiver   Arrange for needed discharge resources and transportation as appropriate   Identify discharge learning needs (meds, wound care, etc)     Problem: Safety - Adult  Goal: Free from fall injury  5/10/2024 0352 by Hiro Wynne, RN  Outcome: Progressing  Flowsheets (Taken 5/10/2024 0057)  Free From Fall Injury: Instruct family/caregiver on patient safety     Problem: ABCDS Injury Assessment  Goal: Absence of physical injury  5/10/2024 0352 by Hiro Wynne, RN  Outcome: Progressing  Flowsheets (Taken 5/10/2024 0057)  Absence of Physical Injury: Implement safety measures based on patient assessment     Problem: Skin/Tissue Integrity  Goal: Absence of new skin breakdown  Description: 1.  Monitor for areas of redness and/or skin breakdown  2.  Assess vascular access sites hourly  3.  Every 4-6 hours minimum:  Change oxygen saturation probe site  4.  Every 4-6 hours:  If on nasal continuous positive airway pressure, respiratory therapy assess nares and determine need for appliance change or resting period.  5/10/2024 0352 by Hiro Wynne, RN  Outcome: Progressing

## 2024-05-10 NOTE — DISCHARGE INSTR - COC
Continuity of Care Form    Patient Name: Ana Handley   :  1928  MRN:  [id number]    Admit date:  2024  Discharge date:  5/10/2024    Code Status Order: Full Code   Advance Directives:     Admitting Physician:  Garland Resendez DO  PCP: Areli Nichols MD    Discharging Nurse:Mya Sams  Discharging Hospital Unit/Room#: 313/313-01  Discharging Unit Phone Number: [phone]    Emergency Contact:   Extended Emergency Contact Information  Primary Emergency Contact: U,UNKNOWN  Home Phone: [phone]  Relation: Unknown  Secondary Emergency Contact: Alexandra Boone  Home Phone: [phone]  Relation: Child  Preferred language: English    Past Surgical History:  History reviewed. No pertinent surgical history.    Immunization History:   Immunization History   Administered Date(s) Administered    COVID-19, PFIZER, ( formula), (age 12y+), IM, 30mcg/0.3mL 10/26/2023       Active Problems:  Patient Active Problem List   Diagnosis Code    Cellulitis L03.90    Breast cancer (HCC) C50.919    Neuropathy G62.9    Primary hypertension I10    Lower extremity edema R60.0    Bilateral cellulitis of lower leg L03.116, L03.115       Isolation/Infection:   Isolation            No Isolation          Patient Infection Status       None to display            Nurse Assessment:  Last Vital Signs: /63   Pulse (!) 120   Temp 97.7 °F (36.5 °C) (Axillary)   Resp 16   Ht 1.626 m (5' 4\")   Wt 52.2 kg (115 lb)   SpO2 93%   BMI 19.74 kg/m²     Last documented pain score (0-10 scale): Pain Level: 1  Last Weight:   Wt Readings from Last 1 Encounters:   24 52.2 kg (115 lb)     Mental Status:  oriented, alert, coherent, logical, thought processes intact, and able to concentrate and follow conversation    IV Access:  - None    Nursing Mobility/ADLs:  Walking   Assisted  Transfer  Assisted  Bathing  Assisted  Dressing  Assisted  Toileting  Assisted  Feeding  Assisted  Med Admin  Assisted  Med Delivery

## 2024-05-10 NOTE — PLAN OF CARE
Problem: Discharge Planning  Goal: Discharge to home or other facility with appropriate resources  5/10/2024 0903 by Mya Sams RN  Outcome: Progressing  Flowsheets (Taken 5/9/2024 2000 by Hiro Wynne RN)  Discharge to home or other facility with appropriate resources:   Identify barriers to discharge with patient and caregiver   Arrange for needed discharge resources and transportation as appropriate   Identify discharge learning needs (meds, wound care, etc)  5/10/2024 0352 by Hiro Wynne RN  Outcome: Progressing  Flowsheets (Taken 5/9/2024 2000)  Discharge to home or other facility with appropriate resources:   Identify barriers to discharge with patient and caregiver   Arrange for needed discharge resources and transportation as appropriate   Identify discharge learning needs (meds, wound care, etc)     Problem: Safety - Adult  Goal: Free from fall injury  5/10/2024 0903 by Mya Sams RN  Outcome: Progressing  Flowsheets (Taken 5/10/2024 0057 by Hiro Wynne RN)  Free From Fall Injury: Instruct family/caregiver on patient safety  5/10/2024 0352 by Hiro Wynne RN  Outcome: Progressing  Flowsheets (Taken 5/10/2024 0057)  Free From Fall Injury: Instruct family/caregiver on patient safety     Problem: ABCDS Injury Assessment  Goal: Absence of physical injury  5/10/2024 0903 by Mya Sams RN  Outcome: Progressing  Flowsheets (Taken 5/10/2024 0057 by Hiro Wynne RN)  Absence of Physical Injury: Implement safety measures based on patient assessment  5/10/2024 0352 by Hiro Wynne RN  Outcome: Progressing  Flowsheets (Taken 5/10/2024 0057)  Absence of Physical Injury: Implement safety measures based on patient assessment     Problem: Skin/Tissue Integrity  Goal: Absence of new skin breakdown  Description: 1.  Monitor for areas of redness and/or skin breakdown  2.  Assess vascular access sites hourly  3.  Every 4-6 hours minimum:  Change oxygen saturation probe site  4.   Every 4-6 hours:  If on nasal continuous positive airway pressure, respiratory therapy assess nares and determine need for appliance change or resting period.  5/10/2024 0352 by Hiro Wynne RN  Outcome: Progressing     Problem: Pain  Goal: Verbalizes/displays adequate comfort level or baseline comfort level  Outcome: Progressing  Flowsheets (Taken 5/10/2024 0903)  Verbalizes/displays adequate comfort level or baseline comfort level:   Encourage patient to monitor pain and request assistance   Assess pain using appropriate pain scale   Administer analgesics based on type and severity of pain and evaluate response

## 2024-05-10 NOTE — CARE COORDINATION
Post Acute Facility/Agency List     Provided child with the following list, the list includes the overall star ratings obtained from CMS per the Medicare Web site (www.Medicare.gov):     [] Long Term Acute Care Facilities  [] Acute Inpatient Rehabilitation Facilities  [] Skilled Nursing Facilities  [x] Home Care    Provided verbal instructions on how to utilize the QR Code to obtain additional detailed star ratings from www.Medicare.gov     offered to print and provide the detailed list:    []Accepted   [x]Declined      1206 Patient and son requested referral sent to [facility],  referral sent.  Laron @ [facility] notified of referral and discharge.   Awaitng call back from [facility].

## 2024-05-10 NOTE — PROGRESS NOTES
Physical Therapy  Facility/Department: [address]  Physical Therapy Daily Progress Note    Name: Ana Handley  : 1928  MRN: [id number]  Date of Service: 5/10/2024    Discharge Recommendations:  Patient would benefit from continued therapy after discharge   PT Equipment Recommendations  Equipment Needed: No  Other: Pt has rollator.      Patient Diagnosis(es): The primary encounter diagnosis was Bilateral cellulitis of lower leg. A diagnosis of Bilateral lower extremity edema was also pertinent to this visit.  Past Medical History:  has no past medical history on file.  Past Surgical History:  has no past surgical history on file.    Assessment   Body Structures, Functions, Activity Limitations Requiring Skilled Therapeutic Intervention: Decreased functional mobility ;Decreased strength;Decreased safe awareness;Decreased endurance;Decreased balance  Assessment: Pt presents with generalized weakness and impaired mobility with admission for cellulitis; pt currently undergoing chemo for breast cancer. [place] and was Independent with mobility with rollator and Independent with ADL's. Currently, pt requires SBA-Supervision for transfers and ambulated 90' and 20' x 2 SBA with rollator. Pt should be able to return to previous living arrangement with assistance as needed. Pt would benefit from further therapy upon discharge.  Treatment Diagnosis: generalized weakness  Therapy Prognosis: Fair  Requires PT Follow-Up: Yes  Activity Tolerance  Activity Tolerance: Patient limited by endurance     Plan   Physical Therapy Plan  General Plan:  (5-6x/week)  Current Treatment Recommendations: Strengthening, Balance training, Functional mobility training, Transfer training, Endurance training, Gait training, Safety education & training, Patient/Caregiver education & training, Therapeutic activities  Safety Devices  Type of Devices: All fall risk precautions in place, Chair alarm in place, Gait belt, Call light

## 2024-05-10 NOTE — PROGRESS NOTES
[facility]  Office: [phone]  Jj Resendez DO, Dieter Ramirez DO, Martin Bailey DO, David Guadalupe DO, Joellen Vicente MD, Jenny Wiseman MD, Neda Lazo MD, Yaritza Mcfadden MD,  Cornelius Saenz MD, Keith Do MD, Jong Jeffers MD,  Loraine Tyson DO, Alberto Jon MD, Ruben Beck MD, Garland Resendez DO, Pearl Cruz MD,  Len Aldridge DO, Olinda Siegel MD, Leandra Malagon MD, Mary Og MD, Shelby Alberts MD,  Melo Maddox MD, Annetta Morales MD, Ancelmo Mcknight MD, Gloria Shafer MD, Torsten Corcoran MD, Dung Prater MD, Vish Mujica DO, Luciano Ardon DO, Santi Case MD,  Uvaldo Daniels MD, Shirley Waterhouse, CNP,  Kristina Munoz CNP, Andre Vitale, CNP,  Antonina Lawrence, MARBELLA, Kajal Kramer, CNP, Olya Clemente, CNP, Denia Davis CNP, Lauren Connor, CNP, Muriel Cottrell, PA-C, Erica Taylor PA-C, India Kearney, CNP, Elizabet Lema, CNP, Dmitri Nichols, CNP, Kimberly High, CNP, Viviana Blanchard, CNP, Angelica Clark, CNS, Mary Jo Akbar, CNP, Laura Henderson CNP, Tracy Schwab, CNP         [facility]   IN-PATIENT SERVICE   [facility]    Progress Note    5/10/2024    10:45 AM    Name:   Ana Handley  MRN:     [id number]     Acct:      [id number]   Room:   313/313-01   Day:  0  Admit Date:  5/8/2024  3:32 PM    PCP:   Areli Nichols MD  Code Status:  Full Code    Subjective:     Patient seen in follow-up for bilateral lower extremity cellulitis, patient states \"I feel better\"    Orthopedic surgery input noted and appreciated.  Patient would prefer to treat her blisters nonoperatively.  Family present at the bedside and multiple questions answered.  The patient tells me that she does not want to continue her breast cancer treatment.  Her lower extremity cellulitis has improved dramatically on Ancef and I feel comfortable transitioning over to Keflex.  I have recommended discharge home with visiting nurse services to not only help with physical

## 2024-05-10 NOTE — DISCHARGE SUMMARY
[facility]  Office: [phone]  Jj Resendez DO, Dieter Ramirez DO, Martin Bailey DO, David Guadalupe DO, Joeleln Vicente MD, Jenny Wiseman MD, Neda Lazo MD, Yaritza Mcfadden MD,  Cornelius Saenz MD, Keith Do MD, Jong Jeffers MD,  Loraine Tyson DO, Alberto Jon MD, Ruben Beck MD, Garland Resendez DO, Pearl Cruz MD,  Len Aldridge DO, Olinda Siegel MD, Leandra Malagon MD, Mary Og MD, Shelby Alberts MD,  Melo Maddox MD, Annetta Morales MD, Ancelmo Mcknight MD, Gloria Shafer MD, Torsten Corcoran MD, Dung Prater MD, Vish Mujica DO, Luciano Ardon DO, Santi Case MD,  Uvaldo Daniels MD, Shirley Waterhouse, CNP,  Kristina Munoz CNP, Andre Vitale, CNP,  Antonina Lawrence, DNP, Kajal Kramer, CNP, Olya Clemente, CNP, Denia Davis CNP, Lauren Connor, CNP, Muriel Cottrell, PA-C, Erica Taylor PA-C, India Kearney, CNP, Elizabet Lema, CNP, Dmitri Nichols, CNP, Kimberly High, CNP, Viviana Blanchard, CNP, Angelica Clark, CNS, Mary Jo Akbar, CNP, Laura Henderson CNP, Tracy Schwab, CNP         [facility]   IN-PATIENT SERVICE   [facility]    Discharge Summary     Patient ID: Ana Handley  :  1928   MRN: [id number]     ACCOUNT:  [id number]   Patient's PCP: Areli Nichols MD  Admit Date: 2024   Discharge Date: 5/10/2024  Length of Stay: 0  Code Status:  Full Code  Admitting Physician: Garland Resendez DO  Discharge Physician: Garland Resendez DO     Active Discharge Diagnoses:     Hospital Problem Lists:  Principal Problem:    Cellulitis  Active Problems:    Breast cancer (HCC)    Neuropathy    Primary hypertension    Lower extremity edema    Bilateral cellulitis of lower leg  Resolved Problems:    * No resolved hospital problems. *      Admission Condition:  fair     Discharged Condition: good    Hospital Stay:     Hospital Course:  Ana Handley is a 89 y.o. female who was admitted for the management of Cellulitis , presented to ER  [facility] [address]      Phone: [phone]   cephALEXin 500 MG capsule  furosemide 20 MG tablet         Time spent on discharge is 20 mins in patient examination, evaluation, counseling as well as medication reconciliation, prescriptions for required medications, discharge plan and follow up.    Electronically signed by   Garland Resendez DO  5/10/2024  10:50 AM      Thank you Areli Crespo MD for the opportunity to be involved in this patient's care.

## 2024-05-12 LAB
MICROORGANISM SPEC CULT: NORMAL
MICROORGANISM SPEC CULT: NORMAL
SERVICE CMNT-IMP: NORMAL
SERVICE CMNT-IMP: NORMAL
SPECIMEN DESCRIPTION: NORMAL
SPECIMEN DESCRIPTION: NORMAL
